# Patient Record
Sex: MALE | Race: BLACK OR AFRICAN AMERICAN | NOT HISPANIC OR LATINO | Employment: FULL TIME | URBAN - METROPOLITAN AREA
[De-identification: names, ages, dates, MRNs, and addresses within clinical notes are randomized per-mention and may not be internally consistent; named-entity substitution may affect disease eponyms.]

---

## 2023-06-19 ENCOUNTER — OFFICE VISIT (OUTPATIENT)
Dept: UROLOGY | Facility: CLINIC | Age: 28
End: 2023-06-19
Payer: COMMERCIAL

## 2023-06-19 ENCOUNTER — TELEPHONE (OUTPATIENT)
Dept: UROLOGY | Facility: CLINIC | Age: 28
End: 2023-06-19

## 2023-06-19 ENCOUNTER — HOSPITAL ENCOUNTER (OUTPATIENT)
Dept: ULTRASOUND IMAGING | Facility: CLINIC | Age: 28
Discharge: HOME | End: 2023-06-19
Attending: STUDENT IN AN ORGANIZED HEALTH CARE EDUCATION/TRAINING PROGRAM | Admitting: STUDENT IN AN ORGANIZED HEALTH CARE EDUCATION/TRAINING PROGRAM
Payer: COMMERCIAL

## 2023-06-19 VITALS
HEIGHT: 70 IN | HEART RATE: 88 BPM | DIASTOLIC BLOOD PRESSURE: 87 MMHG | BODY MASS INDEX: 24.02 KG/M2 | OXYGEN SATURATION: 99 % | TEMPERATURE: 98.7 F | WEIGHT: 167.8 LBS | SYSTOLIC BLOOD PRESSURE: 127 MMHG

## 2023-06-19 DIAGNOSIS — N50.89 TESTICULAR LUMP: Primary | ICD-10-CM

## 2023-06-19 DIAGNOSIS — R30.0 DYSURIA: ICD-10-CM

## 2023-06-19 DIAGNOSIS — N50.89 TESTICULAR LUMP: ICD-10-CM

## 2023-06-19 DIAGNOSIS — Z31.9 ENCOUNTER FOR INFERTILITY: ICD-10-CM

## 2023-06-19 LAB
ALBUMIN UR-MCNC: ABNORMAL MG/DL
APPEARANCE UR: CLEAR
BILIRUB UR QL STRIP: NEGATIVE
COLOR UR AUTO: YELLOW
GLUCOSE UR STRIP-MCNC: 500 MG/DL
HGB UR QL STRIP: NEGATIVE
KETONES UR STRIP-MCNC: NEGATIVE MG/DL
LEUKOCYTE ESTERASE UR QL STRIP: NEGATIVE
MUCOUS THREADS #/AREA URNS LPF: PRESENT /LPF
NITRATE UR QL: NEGATIVE
PH UR STRIP: 6 [PH] (ref 5–7)
RBC #/AREA URNS AUTO: ABNORMAL /HPF
SP GR UR STRIP: 1.02 (ref 1–1.03)
UROBILINOGEN UR STRIP-ACNC: 0.2 E.U./DL
WBC #/AREA URNS AUTO: ABNORMAL /HPF

## 2023-06-19 PROCEDURE — 87798 DETECT AGENT NOS DNA AMP: CPT | Mod: 90 | Performed by: STUDENT IN AN ORGANIZED HEALTH CARE EDUCATION/TRAINING PROGRAM

## 2023-06-19 PROCEDURE — 87563 M. GENITALIUM AMP PROBE: CPT | Mod: 90 | Performed by: STUDENT IN AN ORGANIZED HEALTH CARE EDUCATION/TRAINING PROGRAM

## 2023-06-19 PROCEDURE — 99203 OFFICE O/P NEW LOW 30 MIN: CPT | Performed by: STUDENT IN AN ORGANIZED HEALTH CARE EDUCATION/TRAINING PROGRAM

## 2023-06-19 PROCEDURE — 81001 URINALYSIS AUTO W/SCOPE: CPT | Performed by: STUDENT IN AN ORGANIZED HEALTH CARE EDUCATION/TRAINING PROGRAM

## 2023-06-19 PROCEDURE — 93976 VASCULAR STUDY: CPT | Mod: 26 | Performed by: RADIOLOGY

## 2023-06-19 PROCEDURE — 76870 US EXAM SCROTUM: CPT

## 2023-06-19 PROCEDURE — 87086 URINE CULTURE/COLONY COUNT: CPT | Performed by: STUDENT IN AN ORGANIZED HEALTH CARE EDUCATION/TRAINING PROGRAM

## 2023-06-19 PROCEDURE — 76870 US EXAM SCROTUM: CPT | Mod: 26 | Performed by: RADIOLOGY

## 2023-06-19 PROCEDURE — 99000 SPECIMEN HANDLING OFFICE-LAB: CPT | Performed by: STUDENT IN AN ORGANIZED HEALTH CARE EDUCATION/TRAINING PROGRAM

## 2023-06-19 RX ORDER — CETIRIZINE HYDROCHLORIDE 10 MG/1
10 TABLET ORAL DAILY
COMMUNITY

## 2023-06-19 ASSESSMENT — PAIN SCALES - GENERAL: PAINLEVEL: MODERATE PAIN (4)

## 2023-06-19 NOTE — PROGRESS NOTES
"        Chief Complaint:   Left scrotal lump         History of Present Illness:   Ai Ga is a 27 year old male with no significant past medical history who presents for evaluation of left scrotal lump.     The patient reports a month history of a left scrotal lump and intermittent pain. He notes walking and lifting makes the pain worse. He also notes some occasional discomfort with urination. He denies changes in his urinary stream, sensation of incomplete bladder emptying, and increased urinary frequency and urgency.     He would like a semen analysis checked for infertility. His [place] in [place] and they have been trying to conceive intermittently (about three months out of the year) since 2019. She has reportedly had a normal evaluation.          Past Medical History:   No past medical history on file.         Past Surgical History:   No past surgical history on file.         Medications     Current Outpatient Medications   Medication     cetirizine (ZYRTEC) 10 MG tablet     No current facility-administered medications for this visit.            Allergies:   Patient has no known allergies.         Review of Systems:  From intake questionnaire   Negative 14 system review except as noted on HPI, nurse's note.         Physical Exam:   Patient is a 27 year old  male   Vitals: Blood pressure 127/87, pulse 88, temperature 98.7  F (37.1  C), temperature source Tympanic, height 1.785 m (5' 10.28\"), weight 76.1 kg (167 lb 12.8 oz), SpO2 99 %.  General Appearance Adult: Alert, no acute distress, oriented.  Lungs: Non-labored breathing.  Heart: No obvious jugular venous distension present.  Neuro: Alert, oriented, speech and mentation normal  : small palpable lump at base of left testicle, may be consistent with an epididymal tail cyst.         Assessment and Plan:     Assessment: 27 year old male seen in evaluation for a left scrotal lump and intermittent pain noticeable for the last month. Exam " was notable for a small palpable lump at base of left testicle, may be consistent with an epididymal tail cyst. We will order a testicular US for further evaluation.     He notes some occasional discomfort with urination. Will check a UA, urine culture, and mycoplasma/ureaplasma culture.     I will also order a semen analysis for infertility evaluation. The patient was advised this needs to be completed at [facility] in [place].     Plan:  1. Testicular US to further characterize scrotal lump.   2. UA, urine culture, and mycoplasma/ureaplasma culture for discomfort with urination.   3. Semen analysis for infertility workup.     JOEL LEON PA-C  Department of Urology

## 2023-06-19 NOTE — TELEPHONE ENCOUNTER
Results of testicular US discussed with patient, which noted a small left varicocele. The patient does have concerns about fertility. He is scheduled for a semen analysis next week. We discussed that varicoceles and infertility can be related. Will wait until results of semen analysis are available before determining if treatment is indicated.     UA also showed glycosuria, which can cause discomfort with urination. I suggested evaluation for diabetes with his PCP, which the patient was agreeable to.     Magean Adkins PA-C  Department of Urology

## 2023-06-19 NOTE — NURSING NOTE
"Initial /87   Pulse 88   Temp 98.7  F (37.1  C) (Tympanic)   Ht 1.785 m (5' 10.28\")   Wt 76.1 kg (167 lb 12.8 oz)   SpO2 99%   BMI 23.89 kg/m   Estimated body mass index is 23.89 kg/m  as calculated from the following:    Height as of this encounter: 1.785 m (5' 10.28\").    Weight as of this encounter: 76.1 kg (167 lb 12.8 oz). .    Brandee SIMON CMA 06/19/23 8:20 AM  "

## 2023-06-20 LAB — BACTERIA UR CULT: NO GROWTH

## 2023-06-22 LAB
M GENITALIUM DNA SPEC QL NAA+PROBE: NOT DETECTED
M HOMINIS DNA SPEC QL NAA+PROBE: NOT DETECTED
U PARVUM DNA SPEC QL NAA+PROBE: NOT DETECTED
U UREALYTICUM DNA SPEC QL NAA+PROBE: NOT DETECTED

## 2023-06-30 ENCOUNTER — LAB (OUTPATIENT)
Dept: LAB | Facility: CLINIC | Age: 28
End: 2023-06-30
Payer: COMMERCIAL

## 2023-06-30 DIAGNOSIS — Z31.9 ENCOUNTER FOR INFERTILITY: ICD-10-CM

## 2023-06-30 LAB
ABNORMAL SPERM MORPHOLOGY: 92
ABSTINENCE DAYS: <7 DAYS (ref 2–7)
AGGLUTINATION: NO
ANALYSIS TEMP - CENTIGRADE: 22 CENTIGRADE
COLLECTION METHOD: NORMAL
COLLECTION SITE: NORMAL
CONSENT TO RELEASE TO PARTNER: YES
DAL- RECEIVED TIME: NORMAL
HEAD DEFECT: 92 %
IMMOTILE: 30 %
LIQUEFIED: YES
MIDPIECE DEFECT: 19 %
NON-PROGRESSIVE MOTILITY: 2 %
NORMAL SPERM MORPHOLOGY: 8 % NORMAL FORMS
PROGRESSIVE MOTILITY: 68 %
ROUND CELLS: <0.1 MILLION/ML
SPECIMEN PH: 7.2 PH
SPECIMEN VOLUME: 2.5 ML
SPERM CONCENTRATION: 176 MILLION/ML
TAIL DEFECT: 4 %
TIME OF ANALYSIS: NORMAL
TOTAL PROGRESSIVE MOTILE NUMBER: 299 MILLION
TOTAL SPERM NUMBER: 440 MILLION
VISCOUS: NO
VITALITY: NORMAL

## 2023-06-30 PROCEDURE — 89322 SEMEN ANAL STRICT CRITERIA: CPT

## 2023-07-06 ENCOUNTER — TELEPHONE (OUTPATIENT)
Dept: UROLOGY | Facility: CLINIC | Age: 28
End: 2023-07-06
Payer: COMMERCIAL

## 2023-07-06 NOTE — TELEPHONE ENCOUNTER
----- Message from Maegan Adkins PA-C sent at 7/3/2023 11:39 AM CDT -----  Regarding: Cuong  Could you please schedule this patient with Dr. Hutchins for infertility?    
lvm in regards to message below to schedule consult, provided direct callback to assist in scheduling [phone]  
Vaccine status unknown

## 2023-08-13 ENCOUNTER — HEALTH MAINTENANCE LETTER (OUTPATIENT)
Age: 28
End: 2023-08-13

## 2023-08-22 ENCOUNTER — PRE VISIT (OUTPATIENT)
Dept: UROLOGY | Facility: CLINIC | Age: 28
End: 2023-08-22
Payer: COMMERCIAL

## 2023-08-22 NOTE — CONFIDENTIAL NOTE
Reason for Visit: discuss fertility/ varicocele    Orders/Procedures/Records: SA in system    Rooming Requirements: normal      Kristie Mcfarlane  08/22/23  3:06 PM

## 2024-10-06 ENCOUNTER — HEALTH MAINTENANCE LETTER (OUTPATIENT)
Age: 29
End: 2024-10-06